# Patient Record
Sex: FEMALE | URBAN - METROPOLITAN AREA
[De-identification: names, ages, dates, MRNs, and addresses within clinical notes are randomized per-mention and may not be internally consistent; named-entity substitution may affect disease eponyms.]

---

## 2019-01-11 ENCOUNTER — TELEPHONE (OUTPATIENT)
Dept: FAMILY MEDICINE CLINIC | Facility: CLINIC | Age: 54
End: 2019-01-11

## 2024-11-20 ENCOUNTER — TELEPHONE (OUTPATIENT)
Dept: OTHER | Facility: OTHER | Age: 59
End: 2024-11-20

## 2024-11-21 NOTE — TELEPHONE ENCOUNTER
Nursing home or Independent living name: [facility]   If its not nursing home or Independent living, do they see PCP in Network:  Who is calling: Noelle  Callback number: [phone]  Symptoms: picked at ear/bleeding  Did you page oncall or place in triage hub: MARSHA MENDES

## 2024-11-21 NOTE — TELEPHONE ENCOUNTER
Noelle called again, requesting for call back from on call. Per Noelle, she wanted the on call that was covering for [facility]. [facility]. Gave Noelle correct number to call, [phone]